# Patient Record
Sex: MALE
[De-identification: names, ages, dates, MRNs, and addresses within clinical notes are randomized per-mention and may not be internally consistent; named-entity substitution may affect disease eponyms.]

---

## 2023-04-18 PROBLEM — Z00.00 ENCOUNTER FOR PREVENTIVE HEALTH EXAMINATION: Status: ACTIVE | Noted: 2023-04-18

## 2023-04-20 ENCOUNTER — APPOINTMENT (OUTPATIENT)
Dept: ORTHOPEDIC SURGERY | Facility: CLINIC | Age: 28
End: 2023-04-20
Payer: COMMERCIAL

## 2023-04-20 VITALS — BODY MASS INDEX: 23.38 KG/M2 | HEIGHT: 71 IN | WEIGHT: 167 LBS

## 2023-04-20 DIAGNOSIS — Z78.9 OTHER SPECIFIED HEALTH STATUS: ICD-10-CM

## 2023-04-20 PROCEDURE — 99204 OFFICE O/P NEW MOD 45 MIN: CPT

## 2023-04-21 ENCOUNTER — APPOINTMENT (OUTPATIENT)
Dept: PAIN MANAGEMENT | Facility: CLINIC | Age: 28
End: 2023-04-21
Payer: COMMERCIAL

## 2023-04-21 VITALS
WEIGHT: 167 LBS | OXYGEN SATURATION: 97 % | HEIGHT: 71 IN | SYSTOLIC BLOOD PRESSURE: 123 MMHG | DIASTOLIC BLOOD PRESSURE: 64 MMHG | HEART RATE: 74 BPM | BODY MASS INDEX: 23.38 KG/M2

## 2023-04-21 PROCEDURE — 99204 OFFICE O/P NEW MOD 45 MIN: CPT

## 2023-04-25 NOTE — HISTORY OF PRESENT ILLNESS
[_______] : [unfilled] [___ mths] : [unfilled] month(s) ago [Constant] : constant [2] : a current pain level of 2/10 [Sharp] : sharp [Dull] : dull [Transitioning] : transitioning [Medications] : medications [Neck Pain] : neck pain [6] : an average pain level of 6/10 [1] : a minimum pain level of 1/10 [9] : a maximum pain level of 9/10 [FreeTextEntry1] : 27 year old male patient presents as a referral from Dr. Lee for left sided lumbar radiculopathy and left sided cervical radiculopathy. Patient reports that he developed pain into the left leg around December 2022 without inciting event or trauma. Patient states pain is sharp, shooting from his buttocks down the lateral and posterior aspect of his thigh and calf. Patient also endorses numbness/tingling in left calf and bottom of foot. Patient denies any bowel/bladder incontinence, motor weakness, fever, saddle anesthesia. Patient had MRI done in [place] which he brought to our office showing L5 paracentral disc herniation contacting both L4 and L5 nerve roots on the LEFT. Patient has tried PT > 6 weeks, gabapentin, muscle relaxants and had trigger point injections without much relief. Of note patient also has left sided cervical radiculopathy radiating down to his left elbow. Patient has not done PT for cervical spine or any images to evaluate of C-spine.

## 2023-04-25 NOTE — REASON FOR VISIT
[Initial Visit] : an initial pain management visit [FreeTextEntry2] : left sided lumbar radiculopathy

## 2023-04-25 NOTE — PHYSICAL EXAM
[Within functional limits and without pain] : within functional limits and without pain [Normal muscle bulk without asymmetry] : normal muscle bulk without asymmetry [Normal] : Gait: normal [UE/LE] : Sensory: Intact in bilateral upper & lower extremities [ALL] : Biceps, brachioradialis, triceps, patellar, and achilles 2+ and symmetric bilaterally [Guevara's Sign] : negative Guevara's Sign [SLR] : negative straight leg raise

## 2023-04-25 NOTE — ASSESSMENT
[FreeTextEntry1] : Patient with signs symptoms of left sided lumbar radiculopathy and left sided cervical radiculopathy w/ MRI Lumbar spine showing L5 paracentral disc herniation contacting Left L4 and L5 nerve root. Patient has tried conservative management including been PT for lumbar spine > 6 weeks and medications including gabapentin and muscle relaxants. Will try to get images from cervical spine as this was done in [place] and further identify for any disc pathology. Will send patient another script for cervical spine PT. Talked to patient about pursuing TESI L4/5, L5/S1, all risks and benefits were discussed in detail and patient wishes to pursue this treatment option. \par \par The potential benefits as well as rare but possible risks were reviewed with the patient.  These risks including infection including epidural abscess, meningitis, osteomyelitis, and discitis, bleeding including epidural hematoma, nerve injury, paralysis, failure to relieve pain or worse pain, headache, pneumothorax, elevated blood sugars, allergic reactions, adverse reactions to medications, vasovagal reactions, falls, etc. Following that discussion, we decided together that the potential benefits outweigh the potential risks and we decided to proceed with scheduling the procedure.  I answered all the patient's questions about the procedure including the technical details of the procedure and also offered that if any other questions arise we will also be happy to answer those on the day of the procedure. All questions today were answered to the patient’s satisfaction, and the patient stated their verbal understanding.  \par \par \par \par

## 2023-04-25 NOTE — REVIEW OF SYSTEMS
[Neck Pain] : neck pain [Muscle Pain] : muscle pain [Radiating Pain] : radiating pain [Joint Pain] : joint pain [Joint Stiffness] : joint stiffness [Decreased ROM] : decreased range of motion [Numbness] : numbness [Chills] : no chills [Feeling Tired] : no fatigue [Fever] : no fever [Recent Weight Gain (___ Lbs)] : no recent ~M [unfilled] weight gain [Back Pain] : no back pain [Weakness] : no weakness [Headache] : no headache [Dizziness] : no dizziness [Convulsions] : no convulsions

## 2023-05-28 NOTE — HISTORY OF PRESENT ILLNESS
[de-identified] : 26yo male, Low back pain since Nov 2022, he had xrays and MRI reports done in [place] in Dec 2022. Since the pain began he has been in PT and was on cyclobenzaprine and Gabapentin w/o pain relief. He denies h/o injuries/trauma or accidents, he cannot recall a specific method of injury but think it likely came from dead lifting back in Nov 2022. Pain radiating down lower extremities. Numbness and tingling in L foot, right side is normal w/o complaints. Have not been able to work out due to pain. He described it as "Constantly uncomfortable. He denies recent illness, fevers, weakness, balance problems, saddle anesthesia, urinary retention or fecal incontinence..

## 2023-05-28 NOTE — PHYSICAL EXAM
[de-identified] : General: No acute distress, conversant, well-nourished.\par Head: Normocephalic, atraumatic\par Neck: trachea midline, FROM\par Heart: normotensive and normal rate and rhythm\par Lungs: No labored breathing\par Skin: No abrasions, no rashes, no edema\par Psych: Alert and oriented to person, place and time\par Extremities: no peripheral edema or digital cyanosis\par Gait: Normal gait. Can perform tandem gait.  \par Vascular: warm and well perfused distally, palpable distal pulses\par \par \par MSK:\par Lumbar spine:\par No tenderness to palpation.  No step-off, no deformity.\par \par NEURO EXAM:\par Sensation \par Left L2  -  2/2            \par Left L3  -  2/2\par Left L4  -  2/2\par Left L5  -  2/2\par Left S1  -  2/2\par \par Right L2  -  2/2            \par Right L3  -  2/2\par Right L4  -  2/2\par Right L5  -  2/2\par Right S1  -  2/2\par \par Motor: \par Left L2 (hip flexion)                            5/5                \par Left L3 (knee extension)                   5/5                \par Left L4 (ankle dorsiflexion)                 5/5                \par Left L5 (long toe extensor)                5/5                \par Left S1 (ankle plantar flexion)           5/5\par \par Right L2 (hip flexion)                            5/5                \par Right L3 (knee extension)                   5/5                \par Right L4 (ankle dorsiflexion)                 5/5                \par Right L5 (long toe extensor)                5/5                \par Right S1 (ankle plantar flexion)           5/5\par \par Reflexes: Normal and symmetric\par Negative clonus.  Down-going Babinski.  \par

## 2023-05-28 NOTE — ASSESSMENT
[FreeTextEntry1] : 28yo male, Low back pain since Nov 2022, he had xrays and MRI reports done in [place] in Dec 2022. Since the pain began he has been in PT and was on cyclobenzaprine and Gabapentin w/o pain relief. He denies h/o injuries/trauma or accidents, he cannot recall a specific method of injury but think it likely came from dead lifting back in Nov 2022. Pain radiating down lower extremities. Numbness and tingling in L foot, right side is normal w/o complaints. He is neurologically intact. Will send over his past images and records for us to review. We discussed treatment plans and concluded that he should restart PT, and consider spinal injections. He was given a referral to Dr. NIKKO Yeager. RTC in 4 weeks or sooner if needed. He knows to call with any questions or concerns or if his symptoms acutely worsen. We discussed red flag symptoms that would require emergent evaluation. \par \par

## 2023-09-15 ENCOUNTER — APPOINTMENT (OUTPATIENT)
Dept: PAIN MANAGEMENT | Facility: CLINIC | Age: 28
End: 2023-09-15

## 2023-10-27 ENCOUNTER — APPOINTMENT (OUTPATIENT)
Dept: PAIN MANAGEMENT | Facility: CLINIC | Age: 28
End: 2023-10-27
Payer: COMMERCIAL

## 2023-10-27 VITALS — HEART RATE: 78 BPM | OXYGEN SATURATION: 97 % | DIASTOLIC BLOOD PRESSURE: 60 MMHG | SYSTOLIC BLOOD PRESSURE: 91 MMHG

## 2023-10-27 DIAGNOSIS — M54.2 CERVICALGIA: ICD-10-CM

## 2023-10-27 DIAGNOSIS — M54.12 RADICULOPATHY, CERVICAL REGION: ICD-10-CM

## 2023-10-27 PROCEDURE — 64483 NJX AA&/STRD TFRM EPI L/S 1: CPT | Mod: LT

## 2023-10-27 PROCEDURE — 64484 NJX AA&/STRD TFRM EPI L/S EA: CPT | Mod: LT

## 2023-11-09 ENCOUNTER — APPOINTMENT (OUTPATIENT)
Dept: PAIN MANAGEMENT | Facility: CLINIC | Age: 28
End: 2023-11-09
Payer: COMMERCIAL

## 2023-11-09 VITALS
WEIGHT: 180 LBS | OXYGEN SATURATION: 99 % | BODY MASS INDEX: 25.2 KG/M2 | SYSTOLIC BLOOD PRESSURE: 107 MMHG | HEART RATE: 59 BPM | DIASTOLIC BLOOD PRESSURE: 68 MMHG | HEIGHT: 71 IN

## 2023-11-09 PROCEDURE — 99213 OFFICE O/P EST LOW 20 MIN: CPT

## 2023-11-16 ENCOUNTER — TRANSCRIPTION ENCOUNTER (OUTPATIENT)
Age: 28
End: 2023-11-16

## 2023-11-16 ENCOUNTER — APPOINTMENT (OUTPATIENT)
Dept: PAIN MANAGEMENT | Facility: CLINIC | Age: 28
End: 2023-11-16
Payer: COMMERCIAL

## 2023-11-16 VITALS — HEART RATE: 60 BPM | OXYGEN SATURATION: 99 % | SYSTOLIC BLOOD PRESSURE: 96 MMHG | DIASTOLIC BLOOD PRESSURE: 51 MMHG

## 2023-11-16 DIAGNOSIS — M51.26 OTHER INTERVERTEBRAL DISC DISPLACEMENT, LUMBAR REGION: ICD-10-CM

## 2023-11-16 DIAGNOSIS — M54.50 LOW BACK PAIN, UNSPECIFIED: ICD-10-CM

## 2023-11-16 DIAGNOSIS — M54.16 RADICULOPATHY, LUMBAR REGION: ICD-10-CM

## 2023-11-16 PROCEDURE — 64484 NJX AA&/STRD TFRM EPI L/S EA: CPT | Mod: LT

## 2023-11-16 PROCEDURE — 64483 NJX AA&/STRD TFRM EPI L/S 1: CPT | Mod: LT

## 2023-11-17 ENCOUNTER — NON-APPOINTMENT (OUTPATIENT)
Age: 28
End: 2023-11-17

## 2023-12-01 ENCOUNTER — APPOINTMENT (OUTPATIENT)
Dept: MRI IMAGING | Facility: CLINIC | Age: 28
End: 2023-12-01

## 2023-12-06 ENCOUNTER — APPOINTMENT (OUTPATIENT)
Dept: PAIN MANAGEMENT | Facility: CLINIC | Age: 28
End: 2023-12-06